# Patient Record
Sex: FEMALE | Race: WHITE | NOT HISPANIC OR LATINO
[De-identification: names, ages, dates, MRNs, and addresses within clinical notes are randomized per-mention and may not be internally consistent; named-entity substitution may affect disease eponyms.]

---
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
## 2025-01-24 NOTE — PATIENT PROFILE PEDIATRIC. - FUNCTIONAL SCREEN CURRENT LEVEL: TOILETING, MLM
History of Present Illness:  The patient is here for evaluation of upper shoulder/neck pain today.  Recent bilateral pneumonia and influenza  They are requesting possible Osteopathic Manipulative Treatment today.    Physical Exam:  Vitals:    01/24/25 1125   Pulse: (!) 101     General:  Alert and oriented, in no acute distress  Psychiatric:  Normal Affect and Judgment  Osteopathic Musculoskeletal Exam:   The following areas were identified for the following:  Tissue texture change, Positional Asymmetry, Restriction of motion, and/or tenderness      Region Somatic Dysfunction Severity (0, 1, 2)   Head     Cervical Bilateral paraspinal spasm worse on right 2   Thoracic Decreased upper thoracic motion, especially on the right 1   Lumbar Tight paraspinals bilaterally 1   Sacral Tenderness over right BLANCA today    Pelvic     Lower Extremities     Upper Extremities Poor sc motion especially on the left 1   Rib Cage Elevated first rib left  Restriction of right lower rib cage 1   Abdomen & Other Sites         ASSESSMENT:   Chronic neck pain with exacerbation:  fibromyalgia      Somatic Dysfunction:      cervical spine somatic dysfunction  thoracic spine somatic dysfunction  lumbar spine somatic dysfunction  Sacrum somatic dysfunction  upper extremity somatic dysfunction  rib cage somatic dysfunction        PLAN:  Osteopathic Manipulative Treatment was discussed today with the patient the with goal of improving motion and function of the skeletal, arthrodial, myofascial and visceral structures as well as related vascular, lymphatic and neural elements related to the somatic dysfunction and compensatory changes identified in the osteopathic structural examination.  The patient provided verbal consent for treatment.    Osteopathic Manipulative Treatment Procedure Note    After verbal consent was obtained the following somatic dysfunction was treated:      cervical somatic dysfunction: treatment performed: myofascial  release   pain outcome:  improved      range of motion: improved  thoracic spine somatic dysfunction:  treatment performed:    G5    pain outcome:  improved      range of motion: improved  lumbar spine somatic dysfunction:  treatment performed:   G5    pain outcome:  improved      range of motion: improved  sacrum somatic dysfunction:  treatment performed:   G5     pain outcome:  improved      range of motion: improved  upper extremity somatic dysfunction:  treatment performed:   myofascial release      pain outcome:  improved      range of motion: improved  rib cage somatic dysfunction:  treatment performed:   G5  pain outcome:  improved      range of motion: improved    The patient was instructed to increase fluids and apply ice to any sore areas over the next 72 hours.  The patient was instructed that some post treatment soreness is not unusual, but if they experience worsening pain or any worsening numbness, tingling, or any bowel or bladder incontinence to contact the clinic or report to the Emergency Department for further evaluation.  The patient verbalized understanding and agreement with the above.     0 = independent

## 2025-04-10 NOTE — ED PROVIDER NOTE - SKIN
[facility]  Inpatient/Observation/Outpatient Rehabilitation    Date: 4/10/2025  Patient Name: Hany Hoskins       [] Inpatient Acute/Observation       [x]  Outpatient  : 2014     Plan of Care/Recert ends      [] Pt refused/declined therapy at this time due to:           [x] Pt cancelled due to:  [] No Reason Given   [] Sick/ill   [x] Other:  mom is having to  dad's dog from the vet.     [] Evaluation held by RN/Provider/Physical Therapist due to:    [] High Heart Rate   [] High Blood Pressure   [] Orthopedic Consult   [] Hgb < 7   [] Other:    [] Pt ordered brace per physician request:  [] Proper fit will be completed and education for wearing/skin checks    [] Pt does not require skilled services due to:      Therapist/Assistant will attempt to see this patient, at our earliest opportunity.       David Rao Date: 4/10/2025    
No cyanosis, no pallor, no jaundice, no rash

## 2025-04-12 ENCOUNTER — APPOINTMENT (OUTPATIENT)
Dept: INTERNAL MEDICINE | Facility: CLINIC | Age: 20
End: 2025-04-12

## 2025-04-12 ENCOUNTER — NON-APPOINTMENT (OUTPATIENT)
Age: 20
End: 2025-04-12

## 2025-05-02 ENCOUNTER — OUTPATIENT (OUTPATIENT)
Dept: OUTPATIENT SERVICES | Facility: HOSPITAL | Age: 20
LOS: 1 days | End: 2025-05-02
Payer: COMMERCIAL

## 2025-05-02 VITALS
HEART RATE: 60 BPM | TEMPERATURE: 98 F | OXYGEN SATURATION: 100 % | WEIGHT: 128.09 LBS | SYSTOLIC BLOOD PRESSURE: 121 MMHG | RESPIRATION RATE: 18 BRPM | HEIGHT: 61 IN | DIASTOLIC BLOOD PRESSURE: 78 MMHG

## 2025-05-02 DIAGNOSIS — Z90.49 ACQUIRED ABSENCE OF OTHER SPECIFIED PARTS OF DIGESTIVE TRACT: Chronic | ICD-10-CM

## 2025-05-02 DIAGNOSIS — N62 HYPERTROPHY OF BREAST: ICD-10-CM

## 2025-05-02 DIAGNOSIS — Z30.430 ENCOUNTER FOR INSERTION OF INTRAUTERINE CONTRACEPTIVE DEVICE: Chronic | ICD-10-CM

## 2025-05-02 DIAGNOSIS — Z01.818 ENCOUNTER FOR OTHER PREPROCEDURAL EXAMINATION: ICD-10-CM

## 2025-05-02 LAB
ANION GAP SERPL CALC-SCNC: 13 MMOL/L — SIGNIFICANT CHANGE UP (ref 5–17)
BUN SERPL-MCNC: 13 MG/DL — SIGNIFICANT CHANGE UP (ref 7–23)
CALCIUM SERPL-MCNC: 9.7 MG/DL — SIGNIFICANT CHANGE UP (ref 8.4–10.5)
CHLORIDE SERPL-SCNC: 102 MMOL/L — SIGNIFICANT CHANGE UP (ref 96–108)
CO2 SERPL-SCNC: 26 MMOL/L — SIGNIFICANT CHANGE UP (ref 22–31)
CREAT SERPL-MCNC: 0.66 MG/DL — SIGNIFICANT CHANGE UP (ref 0.5–1.3)
EGFR: 129 ML/MIN/1.73M2 — SIGNIFICANT CHANGE UP
EGFR: 129 ML/MIN/1.73M2 — SIGNIFICANT CHANGE UP
GLUCOSE SERPL-MCNC: 75 MG/DL — SIGNIFICANT CHANGE UP (ref 70–99)
HCT VFR BLD CALC: 43.4 % — SIGNIFICANT CHANGE UP (ref 34.5–45)
HGB BLD-MCNC: 14.6 G/DL — SIGNIFICANT CHANGE UP (ref 11.5–15.5)
MCHC RBC-ENTMCNC: 29.9 PG — SIGNIFICANT CHANGE UP (ref 27–34)
MCHC RBC-ENTMCNC: 33.6 G/DL — SIGNIFICANT CHANGE UP (ref 32–36)
MCV RBC AUTO: 88.8 FL — SIGNIFICANT CHANGE UP (ref 80–100)
NRBC BLD AUTO-RTO: 0 /100 WBCS — SIGNIFICANT CHANGE UP (ref 0–0)
PLATELET # BLD AUTO: 208 K/UL — SIGNIFICANT CHANGE UP (ref 150–400)
POTASSIUM SERPL-MCNC: 4.1 MMOL/L — SIGNIFICANT CHANGE UP (ref 3.5–5.3)
POTASSIUM SERPL-SCNC: 4.1 MMOL/L — SIGNIFICANT CHANGE UP (ref 3.5–5.3)
RBC # BLD: 4.89 M/UL — SIGNIFICANT CHANGE UP (ref 3.8–5.2)
RBC # FLD: 13.1 % — SIGNIFICANT CHANGE UP (ref 10.3–14.5)
SODIUM SERPL-SCNC: 141 MMOL/L — SIGNIFICANT CHANGE UP (ref 135–145)
WBC # BLD: 10.22 K/UL — SIGNIFICANT CHANGE UP (ref 3.8–10.5)
WBC # FLD AUTO: 10.22 K/UL — SIGNIFICANT CHANGE UP (ref 3.8–10.5)

## 2025-05-02 PROCEDURE — 80048 BASIC METABOLIC PNL TOTAL CA: CPT

## 2025-05-02 PROCEDURE — G0463: CPT

## 2025-05-02 PROCEDURE — 85027 COMPLETE CBC AUTOMATED: CPT

## 2025-05-02 NOTE — H&P PST ADULT - PROBLEM SELECTOR PLAN 1
Scheduled for bilateral breast reduction on 5/23/25 with Dr. Keenan Agrawal.  Preop instructions and chlorh wash provided.  Questions answered.

## 2025-05-02 NOTE — H&P PST ADULT - HISTORY OF PRESENT ILLNESS
20 year old female presents to PST for scheduled bilateral breast reduction on 5/23/25 with Dr. Keenan Agrawal. PMH anxiety, headaches, IUD insertion, 2019 lap appendectomy w/ drainage of hemorrhagic paratubal cyst. Denies N/V, SOB, MARIA, chest pain or palpitations.

## 2025-05-02 NOTE — H&P PST ADULT - ASSESSMENT
DASI score: 9  DASI activity: able to perform ADL, stairs and ambulate without SOB or MARIA   Loose teeth or denture: denies

## 2025-05-03 PROBLEM — R51.9 HEADACHE, UNSPECIFIED: Chronic | Status: INACTIVE | Noted: 2021-02-05 | Resolved: 2025-05-02

## 2025-05-23 ENCOUNTER — OUTPATIENT (OUTPATIENT)
Dept: OUTPATIENT SERVICES | Facility: HOSPITAL | Age: 20
LOS: 1 days | End: 2025-05-23
Payer: COMMERCIAL

## 2025-05-23 ENCOUNTER — TRANSCRIPTION ENCOUNTER (OUTPATIENT)
Age: 20
End: 2025-05-23

## 2025-05-23 VITALS
DIASTOLIC BLOOD PRESSURE: 60 MMHG | TEMPERATURE: 98 F | RESPIRATION RATE: 15 BRPM | HEIGHT: 61 IN | OXYGEN SATURATION: 100 % | WEIGHT: 128.09 LBS | SYSTOLIC BLOOD PRESSURE: 90 MMHG | HEART RATE: 86 BPM

## 2025-05-23 VITALS
RESPIRATION RATE: 14 BRPM | OXYGEN SATURATION: 100 % | HEART RATE: 76 BPM | DIASTOLIC BLOOD PRESSURE: 58 MMHG | SYSTOLIC BLOOD PRESSURE: 101 MMHG

## 2025-05-23 DIAGNOSIS — Z90.49 ACQUIRED ABSENCE OF OTHER SPECIFIED PARTS OF DIGESTIVE TRACT: Chronic | ICD-10-CM

## 2025-05-23 DIAGNOSIS — Z30.430 ENCOUNTER FOR INSERTION OF INTRAUTERINE CONTRACEPTIVE DEVICE: Chronic | ICD-10-CM

## 2025-05-23 DIAGNOSIS — N62 HYPERTROPHY OF BREAST: ICD-10-CM

## 2025-05-23 PROCEDURE — 19318 BREAST REDUCTION: CPT | Mod: 50

## 2025-05-23 PROCEDURE — 88305 TISSUE EXAM BY PATHOLOGIST: CPT | Mod: 26

## 2025-05-23 PROCEDURE — 88305 TISSUE EXAM BY PATHOLOGIST: CPT

## 2025-05-23 PROCEDURE — C9399: CPT

## 2025-05-23 RX ORDER — FENTANYL CITRATE-0.9 % NACL/PF 100MCG/2ML
25 SYRINGE (ML) INTRAVENOUS
Refills: 0 | Status: DISCONTINUED | OUTPATIENT
Start: 2025-05-23 | End: 2025-05-23

## 2025-05-23 RX ORDER — HYDROMORPHONE/SOD CHLOR,ISO/PF 2 MG/10 ML
0.8 SYRINGE (ML) INJECTION
Refills: 0 | Status: DISCONTINUED | OUTPATIENT
Start: 2025-05-23 | End: 2025-05-23

## 2025-05-23 RX ORDER — RIMEGEPANT SULFATE 75 MG/75MG
1 TABLET, ORALLY DISINTEGRATING ORAL
Refills: 0 | DISCHARGE

## 2025-05-23 RX ORDER — ONDANSETRON HCL/PF 4 MG/2 ML
4 VIAL (ML) INJECTION ONCE
Refills: 0 | Status: ACTIVE | OUTPATIENT
Start: 2025-05-23 | End: 2026-04-21

## 2025-05-23 RX ORDER — OXYCODONE HYDROCHLORIDE 30 MG/1
5 TABLET ORAL ONCE
Refills: 0 | Status: DISCONTINUED | OUTPATIENT
Start: 2025-05-23 | End: 2025-05-23

## 2025-05-23 RX ORDER — LIDOCAINE HCL/PF 10 MG/ML
0.2 VIAL (ML) INJECTION ONCE
Refills: 0 | Status: COMPLETED | OUTPATIENT
Start: 2025-05-23 | End: 2025-05-23

## 2025-05-23 RX ORDER — APREPITANT 40 MG/1
40 CAPSULE ORAL ONCE
Refills: 0 | Status: COMPLETED | OUTPATIENT
Start: 2025-05-23 | End: 2025-05-23

## 2025-05-23 RX ORDER — SODIUM CHLORIDE 9 G/1000ML
1000 INJECTION, SOLUTION INTRAVENOUS
Refills: 0 | Status: ACTIVE | OUTPATIENT
Start: 2025-05-23 | End: 2026-04-21

## 2025-05-23 RX ORDER — CEFAZOLIN SODIUM IN 0.9 % NACL 3 G/100 ML
2000 INTRAVENOUS SOLUTION, PIGGYBACK (ML) INTRAVENOUS ONCE
Refills: 0 | Status: COMPLETED | OUTPATIENT
Start: 2025-05-23 | End: 2025-05-23

## 2025-05-23 RX ADMIN — Medication 3 MILLILITER(S): at 06:30

## 2025-05-23 RX ADMIN — SODIUM CHLORIDE 100 MILLILITER(S): 9 INJECTION, SOLUTION INTRAVENOUS at 06:32

## 2025-05-23 RX ADMIN — Medication 1 APPLICATION(S): at 06:32

## 2025-05-23 RX ADMIN — Medication 25 MICROGRAM(S): at 10:15

## 2025-05-23 RX ADMIN — APREPITANT 40 MILLIGRAM(S): 40 CAPSULE ORAL at 06:33

## 2025-05-23 RX ADMIN — Medication 25 MICROGRAM(S): at 10:00

## 2025-05-23 NOTE — ASU PATIENT PROFILE, ADULT - FALL HARM RISK - UNIVERSAL INTERVENTIONS
Bed in lowest position, wheels locked, appropriate side rails in place/Call bell, personal items and telephone in reach/Instruct patient to call for assistance before getting out of bed or chair/Non-slip footwear when patient is out of bed/[place] to call system/Physically safe environment - no spills, clutter or unnecessary equipment/Purposeful Proactive Rounding/Room/bathroom lighting operational, light cord in reach

## 2025-05-23 NOTE — ASU DISCHARGE PLAN (ADULT/PEDIATRIC) - D. IF YOU HAD ANY TYPE OF SEDATION, YOU MAY EXPERIENCE LIGHTHEADEDNESS, DIZZINESS, OR SLEEPINESS FOLLOWING YOUR PROCEDURE. A RESPONSIBLE ADULT SHOULD STAY WITH YOU FOR AT LEAST 24 HOURS FOLLOWING YOUR PROCEDURE.
0815- sedation turned off for sedation holiday. Pt following commands and able to make needs known, however pt became acutely hypoxic with O2 sats in the mid to low 80s. Pulse ox with good wave form and four stars correlating with heart rate. Pt suctioned with some bloody secretions returned. O2 sat remained in the mid 80s. FIO2 turned up to 100% on vent when O2 sat was 82%. Pt resedated. Respiratory therapy called however unable to come immediately. Dr. Rivera in nursing station and asked to come to bedside. Peep increased to 10 and chest xray ordered. Pt suctioned again for large return of copious bloody secretions. Pt returned to normal O2 sats.   1015-Dr. Kennedy updated on events of am and respiratory. Peep remains at 10 and weaning FiO2 as tolerated.    Statement Selected

## 2025-05-23 NOTE — BRIEF OPERATIVE NOTE - OPERATION/FINDINGS
Bilateral breast reduction via superomedial pedicle, L approx 240g, R approx 480g. Superior advancement of R IMF and secured to chest wall with 0 vicryl.

## 2025-05-23 NOTE — ASU DISCHARGE PLAN (ADULT/PEDIATRIC) - CARE PROVIDER_API CALL
Keenan Agrawal  Plastic Surgery  [address]  [place] [address]  Phone: [phone]  Fax: [phone]  Follow Up Time: 1 week

## 2025-05-23 NOTE — ASU DISCHARGE PLAN (ADULT/PEDIATRIC) - ASU DC SPECIAL INSTRUCTIONSFT
1) Take tylenol and the prescribed pain medication as instructed on the medication bottles  2) Advance diet as tolerated.  3) Leave steri-strips underneath in place. May shower after 48 hours. Allow soapy water to run over, do not scrub. Pat dry. Then put bra back on.   4) Avoid vigorous exercise and heavy lifting (>10 lbs) until at least follow-up appointment.  5) Follow up with Dr Agrawal's office next week

## 2025-05-23 NOTE — ASU DISCHARGE PLAN (ADULT/PEDIATRIC) - NURSING INSTRUCTIONS
Your first dose of Tylenol was given at ____7:20 AM_______.  Next dose of Tylenol will be on or after ___1:20 PM________ ,today/tonight and every 6 hours afterwards as needed for pain management, do not take any Tylenol containing products until this time. Do not exceed more than 4000mg of Tylenol in one 24 hour setting.

## 2025-05-23 NOTE — ASU DISCHARGE PLAN (ADULT/PEDIATRIC) - FINANCIAL ASSISTANCE
[facility] provides services at a reduced cost to those who are determined to be eligible through [facility]’s financial assistance program. Information regarding [facility]’s financial assistance program can be found by going to [email or web address] or by calling [phone].

## 2025-06-03 LAB — SURGICAL PATHOLOGY STUDY: SIGNIFICANT CHANGE UP

## (undated) DEVICE — TUBING INFILTRATION

## (undated) DEVICE — PACK BREAST RECONSTRUCTION

## (undated) DEVICE — GLV 7 PROTEXIS (WHITE)

## (undated) DEVICE — TUBING ASPIRATION HI VAC LIPOSUCTION 8FT

## (undated) DEVICE — STAPLER SKIN VISI-STAT 35 WIDE

## (undated) DEVICE — ABDOMINAL BINDER MED/LG 12" X 45"-62"

## (undated) DEVICE — DRSG STERISTRIPS 0.5 X 4"

## (undated) DEVICE — BRA PINK 4XL 48-51"

## (undated) DEVICE — DRSG CURITY GAUZE SPONGE 4 X 4" 12-PLY

## (undated) DEVICE — SUT MONOCRYL 3-0 27" PS-2 UNDYED

## (undated) DEVICE — DRSG MAMMARY SUPPORT XL SIZE 5

## (undated) DEVICE — DRSG COMBINE 5 X 9"

## (undated) DEVICE — PREP CHLORAPREP HI-LITE ORANGE 26ML

## (undated) DEVICE — PACK BASIN SPECIAL PROCEDURE

## (undated) DEVICE — ELCTR STRYKER NEPTUNE SMOKE EVACUATION PENCIL (GREEN)

## (undated) DEVICE — DRAPE INSTRUMENT POUCH 6.75" X 11"

## (undated) DEVICE — GLV 7.5 PROTEXIS (WHITE)

## (undated) DEVICE — DRSG MAMMARY SUPPORT SM SIZE 1

## (undated) DEVICE — LAP PAD 18 X 18"

## (undated) DEVICE — GLV 8 PROTEXIS (WHITE)

## (undated) DEVICE — SOL IRR POUR NS 0.9% 500ML

## (undated) DEVICE — SUT POLYSORB 0 30" GS-21 UNDYED

## (undated) DEVICE — MEDICATION LABELS W MARKER

## (undated) DEVICE — DRSG MAMMARY SUPPORT MED SIZE 3

## (undated) DEVICE — GOWN XL EXTRA LONG

## (undated) DEVICE — DRSG MASTISOL

## (undated) DEVICE — WARMING BLANKET LOWER ADULT

## (undated) DEVICE — SYR LUER LOK 10CC

## (undated) DEVICE — DRSG XEROFORM 5 X 9"

## (undated) DEVICE — DRSG MAMMARY SUPPORT LG SIZE 4

## (undated) DEVICE — DRAPE IOBAN 23" X 23"

## (undated) DEVICE — DRAPE 1/2 SHEET 40X57"

## (undated) DEVICE — BLADE SCALPEL SAFETYLOCK #10

## (undated) DEVICE — WARMING BLANKET FULL UNDERBODY

## (undated) DEVICE — NDL HYPO REGULAR BEVEL 25G X 1.5" (BLUE)

## (undated) DEVICE — DRSG MAMMARY SUPPORT MED SIZE 2

## (undated) DEVICE — SOL INJ LR 500ML

## (undated) DEVICE — VENODYNE/SCD SLEEVE CALF MEDIUM

## (undated) DEVICE — SPECIMEN CONTAINER 100ML

## (undated) DEVICE — MARKING PEN W RULER

## (undated) DEVICE — BASIN AMBULATORY

## (undated) DEVICE — ONETRAC LIGHTED RETRACTOR 90 X 22MM DISP